# Patient Record
Sex: MALE
[De-identification: names, ages, dates, MRNs, and addresses within clinical notes are randomized per-mention and may not be internally consistent; named-entity substitution may affect disease eponyms.]

---

## 2018-12-28 ENCOUNTER — TELEPHONE (OUTPATIENT)
Dept: SCHEDULING | Age: 53
End: 2018-12-28

## 2024-04-11 ENCOUNTER — APPOINTMENT (OUTPATIENT)
Dept: CT IMAGING | Age: 59
End: 2024-04-11
Attending: EMERGENCY MEDICINE
Payer: COMMERCIAL

## 2024-04-11 ENCOUNTER — HOSPITAL ENCOUNTER (OUTPATIENT)
Age: 59
Discharge: HOME | End: 2024-04-11
Attending: EMERGENCY MEDICINE
Payer: COMMERCIAL

## 2024-04-11 VITALS
DIASTOLIC BLOOD PRESSURE: 66 MMHG | OXYGEN SATURATION: 96 % | TEMPERATURE: 99 F | WEIGHT: 225 LBS | RESPIRATION RATE: 20 BRPM | HEART RATE: 65 BPM | BODY MASS INDEX: 31.5 KG/M2 | HEIGHT: 71 IN | SYSTOLIC BLOOD PRESSURE: 156 MMHG

## 2024-04-11 DIAGNOSIS — R19.00 ABDOMINAL MASS, UNSPECIFIED ABDOMINAL LOCATION: Primary | ICD-10-CM

## 2024-04-11 PROCEDURE — 99204 OFFICE O/P NEW MOD 45 MIN: CPT

## 2024-04-11 PROCEDURE — 74176 CT ABD & PELVIS W/O CONTRAST: CPT | Performed by: EMERGENCY MEDICINE

## 2024-04-11 RX ORDER — BLOOD-GLUCOSE SENSOR
1 EACH MISCELLANEOUS
Qty: 1 EACH | Refills: 0 | Status: SHIPPED | OUTPATIENT
Start: 2024-04-11

## 2024-04-11 RX ORDER — VARENICLINE TARTRATE 0.5 (11)-1
KIT ORAL
Qty: 1 EACH | Refills: 0 | Status: SHIPPED | OUTPATIENT
Start: 2024-04-11 | End: 2024-05-08

## 2024-04-11 NOTE — DISCHARGE INSTRUCTIONS
Follow up with your primary care doctor for further evaluation of your liver  Take chantix as directed for smoking cessation.  Start 1-2 weeks before the day you stop smoking  Continue to monitor your glucose levels

## 2024-04-11 NOTE — ED INITIAL ASSESSMENT (HPI)
Patient c/o lump to mid upper abdomen X 2 months. Denies nausea, vomiting, diarrhea,constipation or pain. History of DM.

## 2024-04-11 NOTE — ED PROVIDER NOTES
Patient Seen in: [facility]      History     Chief Complaint   Patient presents with    Lump     Stated Complaint: Lump in upper part of abdomen    Subjective:   HPI    57 yo male with IDDM c/o \"bump\" on his abdomen for past 2 months.  Denies nausea/vomiting/pain or fever.  NO melena or diarrhea.  NO chest pain or shortness of breath.  No prior abdominal surgery.    Objective:   Past Medical History:    Type 1 diabetes mellitus (HCC)              No pertinent past surgical history.              No pertinent social history.            Review of Systems    Positive for stated complaint: Lump in upper part of abdomen  Other systems are as noted in HPI.  Constitutional and vital signs reviewed.      All other systems reviewed and negative except as noted above.    Physical Exam     ED Triage Vitals [04/11/24 1435]   /66   Pulse 65   Resp 20   Temp 98.6 °F (37 °C)   Temp src Temporal   SpO2 96 %   O2 Device None (Room air)       Current:/66   Pulse 65   Temp 98.6 °F (37 °C) (Temporal)   Resp 20   Ht 180.3 cm (5' 11\")   Wt 102.1 kg   SpO2 96%   BMI 31.38 kg/m²         Physical Exam    General: Alert and oriented. No acute distress.  HEENT: Normocephalic. No evidence of trauma. Extraocular movements are intact.  Cardiovascular exam: Regular rate and rhythm  Lungs: Clear to auscultation bilaterally.  Abdomen: Soft, nondistended, nontender.  Patient has midline area of fullness in the epigastric region.  It is not pulsatile.  I do not feel a discrete hernia sac.  It is nontender to palpation.  Palpation is also limited due to patient's body habitus.  Extremities: No evidence of deformity. No clubbing or cyanosis.  Neuro: No focal deficit is noted.    ED Course   Labs Reviewed - No data to display  Unclear etiology for patient's midline abdominal mass.  Will obtain a CT scan of the abdomen pelvis.  CONCLUSION:    1. Bladder wall thickening.  Correlation with urinalysis is recommended    2.  Cirrhotic appearance of the liver.    3. Mild gallbladder wall thickening with pericholecystic fluid..  Wall thickening may be secondary incomplete distension but correlation with clinical symptoms to exclude acute cholecystitis.  If further evaluation is needed, consider ultrasound.   4. Splenomegaly   5. Colonic diverticulosis without evidence of diverticulitis.          LOCATION:  [facility]         Dictated by (CST): Alisa Stallworth MD on 4/11/2024 at 3:15 PM       Finalized by (CST): Alisa Stallworth MD on 4/11/2024 at 3:18 PM       Findings of the CT scan were discussed with the patient at bedside.  Patient states that he believes he may have contracted hepatitis C in the past.  He also admits to drinking heavily on a regular basis.  We did discuss the potential risks of persistent drinking including the risk of developing liver cirrhosis and failure, possibility of alcohol dependence and withdrawal issues.  We also did discuss that he should follow-up with a primary care provider to get baseline testing of his liver and for further evaluation.  We also discussed smoking cessation.  Patient is requesting a prescription for varenicline and a continuous glucose monitor.       MDM   Patient was screened and evaluated during this visit.   As a treating physician attending to the patient, I determined, within reasonable clinical confidence and prior to discharge, that an emergency medical condition was not or was no longer present.  There was no indication for further evaluation, treatment or admission on an emergency basis.  Comprehensive verbal and written discharge and follow-up instructions were provided to help prevent relapse or worsening.  Patient was instructed to follow-up with her primary care provider for further evaluation and treatment, but to return immediately to the ER for worsening, concerning, new, changing or persisting symptoms.  I discussed the case with the patient and they had no questions,  complaints, or concerns.  Patient felt comfortable going home.    ^^Please note that this report has been produced using speech recognition software and may contain errors related to that system including, but not limited to, errors in grammar, punctuation, and spelling, as well as words and phrases that possibly may have been recognized inappropriately.  If there are any questions or concerns, contact the dictating provider for clarification                                   Medical Decision Making      Disposition and Plan     Clinical Impression:  1. Abdominal mass, unspecified abdominal location         Disposition:  Discharge  4/11/2024  3:26 pm    Follow-up:  No follow-up provider specified.        Medications Prescribed:  There are no discharge medications for this patient.